# Patient Record
Sex: FEMALE | Race: WHITE | NOT HISPANIC OR LATINO | URBAN - METROPOLITAN AREA
[De-identification: names, ages, dates, MRNs, and addresses within clinical notes are randomized per-mention and may not be internally consistent; named-entity substitution may affect disease eponyms.]

---

## 2021-09-15 ENCOUNTER — NEW PATIENT ROUTINE/VISION (OUTPATIENT)
Dept: URBAN - METROPOLITAN AREA CLINIC 53 | Facility: CLINIC | Age: 66
End: 2021-09-15

## 2021-09-15 DIAGNOSIS — H16.223: ICD-10-CM

## 2021-09-15 DIAGNOSIS — Z01.01: ICD-10-CM

## 2021-09-15 DIAGNOSIS — H01.004: ICD-10-CM

## 2021-09-15 DIAGNOSIS — H01.001: ICD-10-CM

## 2021-09-15 DIAGNOSIS — H25.13: ICD-10-CM

## 2021-09-15 PROCEDURE — 92015 DETERMINE REFRACTIVE STATE: CPT

## 2021-09-15 PROCEDURE — 92134 CPTRZ OPH DX IMG PST SGM RTA: CPT

## 2021-09-15 PROCEDURE — 92004 COMPRE OPH EXAM NEW PT 1/>: CPT

## 2021-09-15 ASSESSMENT — KERATOMETRY
OD_AXISANGLE2_DEGREES: 111
OD_K2POWER_DIOPTERS: 45.25
OS_K2POWER_DIOPTERS: 46.00
OD_K1POWER_DIOPTERS: 44.00
OD_AXISANGLE_DEGREES: 21
OS_AXISANGLE_DEGREES: 140
OS_K1POWER_DIOPTERS: 44.75
OS_AXISANGLE2_DEGREES: 50

## 2021-09-15 ASSESSMENT — VISUAL ACUITY
OS_GLARE: 20/20
OD_CC: 20/40
OD_GLARE: 20/30
OS_CC: 20/20
OD_GLARE: 20/40
OS_GLARE: 20/25
OD_PH: 20/25

## 2021-09-15 ASSESSMENT — TONOMETRY
OS_IOP_MMHG: 15
OD_IOP_MMHG: 15

## 2021-09-15 NOTE — PATIENT DISCUSSION
Patient is a previous Cl wearer. Patient not interested in contacts at this time. Will call to schedule appointment when ready. Previously wore [employer] soft monthly lenses. [facility] for OD distance. Patient aware to bring lens information to appointment.

## 2021-09-15 NOTE — PATIENT DISCUSSION
Drop regiment discussed with patient. Strat PF tears OU 3-4x/day. Start warm compresses OU BID. Start lid scrubs OU BID.

## 2023-12-01 NOTE — PATIENT DISCUSSION
Glasses Rx given for Office lens.
PAL Rx is stable.
Patient given Rx for glasses.
You can access the FollowMyHealth Patient Portal offered by Health system by registering at the following website: [email or web address]. By joining [email or web address]’s FollowMyHealth portal, you will also be able to view your health information using other applications (apps) compatible with our system.